# Patient Record
(demographics unavailable — no encounter records)

---

## 2025-02-26 NOTE — HISTORY OF PRESENT ILLNESS
[de-identified] : 84-year-old male patient presented with his daughter-in-law and son for his annual physical examination. He lost his wife 2 years ago and continues to feel sad about this. Patient's recent lab results showed a drop in hemoglobin from 12.6 to 11.5 and slightly high LDL at 105, though he is on Simvastatin 20mg. Overall, metabolic panel was fine and prostate and thyroid health was good. The patient has been taking a medication (Donepezil) for dementia, two blood pressure medications, a cholesterol medication (Simvastatin), and an antidepressant.

## 2025-02-26 NOTE — HISTORY OF PRESENT ILLNESS
[de-identified] : 84-year-old male patient presented with his daughter-in-law and son for his annual physical examination. He lost his wife 2 years ago and continues to feel sad about this. Patient's recent lab results showed a drop in hemoglobin from 12.6 to 11.5 and slightly high LDL at 105, though he is on Simvastatin 20mg. Overall, metabolic panel was fine and prostate and thyroid health was good. The patient has been taking a medication (Donepezil) for dementia, two blood pressure medications, a cholesterol medication (Simvastatin), and an antidepressant.

## 2025-03-20 NOTE — PHYSICAL EXAM
[Normal Sclera/Conjunctiva] : normal sclera/conjunctiva [Supple] : the neck was supple [Clear to Auscultation] : lungs were clear to auscultation bilaterally [Regular Rhythm] : with a regular rhythm [Non Tender] : non-tender [Normal Gait] : normal gait [de-identified] : robust  cooperative   looking younger than stated age

## 2025-03-20 NOTE — PHYSICAL EXAM
[Normal Sclera/Conjunctiva] : normal sclera/conjunctiva [Supple] : the neck was supple [Clear to Auscultation] : lungs were clear to auscultation bilaterally [Regular Rhythm] : with a regular rhythm [Non Tender] : non-tender [Normal Gait] : normal gait [de-identified] : robust  cooperative   looking younger than stated age

## 2025-03-20 NOTE — HISTORY OF PRESENT ILLNESS
[Patient] : patient [FreeTextEntry2] :  84 ramos old  male with dementia CAD  patient  is seen today for a follow  up    and  has been  stable  since last visit  with  no new major developments  complains, hospitalizations  and no changes with medications  and   unchanged chronic  condition  interval events  reviewed and addressed  patient is doing well  forgets to take his pills  reinforced compliance   denies  any shortness of breath  weight changes   Appetite is "ok" Ambulating through home- no recent falls Continent of bowel and bladder- has no constipation  Anxiety- mood stable, reports no issues, has been on escitalopram